# Patient Record
Sex: MALE | Race: WHITE | NOT HISPANIC OR LATINO | Employment: FULL TIME | URBAN - METROPOLITAN AREA
[De-identification: names, ages, dates, MRNs, and addresses within clinical notes are randomized per-mention and may not be internally consistent; named-entity substitution may affect disease eponyms.]

---

## 2023-09-28 ENCOUNTER — OFFICE VISIT (OUTPATIENT)
Dept: FAMILY MEDICINE | Facility: CLINIC | Age: 28
End: 2023-09-28
Payer: COMMERCIAL

## 2023-09-28 VITALS
RESPIRATION RATE: 16 BRPM | OXYGEN SATURATION: 97 % | SYSTOLIC BLOOD PRESSURE: 117 MMHG | TEMPERATURE: 98.9 F | DIASTOLIC BLOOD PRESSURE: 78 MMHG | WEIGHT: 165 LBS | HEART RATE: 93 BPM

## 2023-09-28 DIAGNOSIS — J40 BRONCHITIS: Primary | ICD-10-CM

## 2023-09-28 PROCEDURE — 99203 OFFICE O/P NEW LOW 30 MIN: CPT | Performed by: PHYSICIAN ASSISTANT

## 2023-09-28 RX ORDER — ALBUTEROL SULFATE 90 UG/1
2 AEROSOL, METERED RESPIRATORY (INHALATION) EVERY 6 HOURS PRN
Qty: 18 G | Refills: 0 | Status: SHIPPED | OUTPATIENT
Start: 2023-09-28

## 2023-09-28 RX ORDER — BENZONATATE 100 MG/1
CAPSULE ORAL
Qty: 45 CAPSULE | Refills: 0 | Status: SHIPPED | OUTPATIENT
Start: 2023-09-28

## 2023-09-28 NOTE — PROGRESS NOTES
Chief Complaint   Patient presents with    Cough     Cough x 1 week getting worse. Neg home covid test on Tues.       ASSESSMENT/PLAN:  Gabriel was seen today for cough.    Diagnoses and all orders for this visit:    Bronchitis  -     albuterol (PROAIR HFA/PROVENTIL HFA/VENTOLIN HFA) 108 (90 Base) MCG/ACT inhaler; Inhale 2 puffs into the lungs every 6 hours as needed for shortness of breath or wheezing  -     benzonatate (TESSALON) 100 MG capsule; Take 1-2 capsules by mouth up to 3 x a day as needed with food    Appears well overall, reassuring exam and vitals.  Consistent with viral bronchitis and will give symptomatic cares as outlined in AVS and above    Juan Ramon Pinto PA-C      SUBJECTIVE:  Gabriel is a 28 year old male who presents to urgent care with about 5 to 6 days of cough.  Seems to be worsening little bit.  A little bit productive in the morning.  Daughter is ill as well.  No shortness of breath or chest pain.  Does have some mild fatigue and malaise.    ROS: Pertinent ROS neg other than the symptoms noted above in the HPI.     OBJECTIVE:  /78   Pulse 93   Temp 98.9  F (37.2  C) (Oral)   Resp 16   Wt 74.8 kg (165 lb)   SpO2 97%    GENERAL: healthy, alert and no distress  EYES: Eyes grossly normal to inspection, PERRL and conjunctivae and sclerae normal  HENT: ear canals and TM's normal, nose and mouth without ulcers or lesions  NECK: no adenopathy, no asymmetry, masses, or scars and thyroid normal to palpation  RESP: lungs clear to auscultation - no rales, rhonchi or wheezes, frequent cough  CV: regular rate and rhythm, normal S1 S2, no S3 or S4, no murmur, click or rub, no peripheral edema and peripheral pulses strong    DIAGNOSTICS    No results found for any visits on 09/28/23.     No current outpatient medications on file.     No current facility-administered medications for this visit.      There is no problem list on file for this patient.     No past medical history on file.  No past  surgical history on file.  No family history on file.  Social History     Tobacco Use    Smoking status: Never     Passive exposure: Never    Smokeless tobacco: Never   Substance Use Topics    Alcohol use: Not on file              The plan of care was discussed with the patient. They understand and agree with the course of treatment prescribed. A printed summary was given including instructions and medications.  The use of Dragon/Terascal[employer] dictation services may have been used to construct the content in this note; any grammatical or spelling errors are non-intentional. Please contact the author of this note directly if you are in need of any clarification.

## 2023-09-28 NOTE — PATIENT INSTRUCTIONS
You have  bronchitis  This commonly causes symptoms of your throat, nose, sinuses and bronchi.    You do not need to do anything besides rest and hydrate and your body will get over this.  Sometimes it can take up to 2 weeks to do so.  And the symptoms can be very annoying.    People are commonly contagious for about 3 to 5 days.  Wearing a mask will significantly reduce your risk of transmitting this to someone else if you are within that timeframe.    Some things that you can do to help with your symptoms include:    Pain, malaise and inflammation:  Ibuprofen and Tylenol for pain and inflammation.  I prefer ibuprofen  Ibuprofen 400-600 mg (2-3 of the 200 mg OTC tablets or 400-600 mg of the children's liquid) up to 4 times daily with food or milk  Tylenol 500-1000 mg every 8 hours as needed    For nasal congestion and drainage  Flonase/fluticasone nasal spray 2 sprays in each nostril once a day for 1 - 4 weeks.  This may take several days to become effective.  Consider saline nasal rinses  Psuedofed can help if you tolerate it but do not take if you have high blood pressure     Cough:  Mucinex or guaifenesin can help get mucus out of your body and help with cough.  Dextromethorphan is a cough suppressant that may be helpful  Tessalon Perles may be prescribed    Ear fullness or pain:  Flonase as above  Ibuprofen as above  Otovent, which is a balloon you blow up with your nose and helps pop the ears and regulate the pressure can be bought off of [employer] and works quite well    Supplements that may also be helpful:  Cold snap  Zicam  Zinc    Be sure to eat nutrient dense foods with a good mixture of fats, carbohydrates and proteins.  Your body burns more calories while sick.

## 2024-03-10 ENCOUNTER — HEALTH MAINTENANCE LETTER (OUTPATIENT)
Age: 29
End: 2024-03-10

## 2025-03-16 ENCOUNTER — HEALTH MAINTENANCE LETTER (OUTPATIENT)
Age: 30
End: 2025-03-16

## 2025-04-07 ENCOUNTER — OFFICE VISIT (OUTPATIENT)
Dept: URGENT CARE | Facility: URGENT CARE | Age: 30
End: 2025-04-07
Payer: COMMERCIAL

## 2025-04-07 VITALS
RESPIRATION RATE: 18 BRPM | HEART RATE: 81 BPM | HEIGHT: 67 IN | DIASTOLIC BLOOD PRESSURE: 75 MMHG | SYSTOLIC BLOOD PRESSURE: 109 MMHG | BODY MASS INDEX: 25.11 KG/M2 | OXYGEN SATURATION: 97 % | TEMPERATURE: 98.3 F | WEIGHT: 160 LBS

## 2025-04-07 DIAGNOSIS — B34.9 VIRAL ILLNESS: Primary | ICD-10-CM

## 2025-04-07 LAB
DEPRECATED S PYO AG THROAT QL EIA: NEGATIVE
FLUAV AG SPEC QL IA: NEGATIVE
FLUBV AG SPEC QL IA: NEGATIVE
S PYO DNA THROAT QL NAA+PROBE: NOT DETECTED
SARS-COV-2 RNA RESP QL NAA+PROBE: NEGATIVE

## 2025-04-07 PROCEDURE — 99214 OFFICE O/P EST MOD 30 MIN: CPT | Performed by: PHYSICIAN ASSISTANT

## 2025-04-07 PROCEDURE — 87804 INFLUENZA ASSAY W/OPTIC: CPT | Performed by: PHYSICIAN ASSISTANT

## 2025-04-07 PROCEDURE — 3078F DIAST BP <80 MM HG: CPT | Performed by: PHYSICIAN ASSISTANT

## 2025-04-07 PROCEDURE — 87635 SARS-COV-2 COVID-19 AMP PRB: CPT | Performed by: PHYSICIAN ASSISTANT

## 2025-04-07 PROCEDURE — 3074F SYST BP LT 130 MM HG: CPT | Performed by: PHYSICIAN ASSISTANT

## 2025-04-07 PROCEDURE — 87651 STREP A DNA AMP PROBE: CPT | Performed by: PHYSICIAN ASSISTANT

## 2025-04-07 RX ORDER — ALBUTEROL SULFATE 90 UG/1
2 INHALANT RESPIRATORY (INHALATION) EVERY 6 HOURS
Qty: 18 G | Refills: 0 | Status: SHIPPED | OUTPATIENT
Start: 2025-04-07 | End: 2025-05-07

## 2025-04-07 RX ORDER — BENZONATATE 100 MG/1
100 CAPSULE ORAL 3 TIMES DAILY PRN
Qty: 30 CAPSULE | Refills: 0 | Status: SHIPPED | OUTPATIENT
Start: 2025-04-07 | End: 2025-04-17

## 2025-04-07 NOTE — PROGRESS NOTES
Patient presents with:  Cough: Sore throat, congestion, fever, cough exp to strep and rsv        Clinical Decision Making:  Strep test was obtained and was negative.  Culture is to follow.  Influenza testing is negative.  COVID-19 screening test is pending.  Treatment with albuterol and Tessalon Perles for cough.  Symptomatic care was gone over. Expected course of resolution and indication for return was gone over and questions were answered to patient/parent's satisfaction before discharge.        ICD-10-CM    1. Viral illness  B34.9 Streptococcus A Rapid Screen w/Reflex to PCR     Influenza A & B Antigen     COVID-19 Virus (Coronavirus) by PCR Nose     Group A Streptococcus PCR Throat Swab     albuterol (PROAIR HFA/PROVENTIL HFA/VENTOLIN HFA) 108 (90 Base) MCG/ACT inhaler     benzonatate (TESSALON) 100 MG capsule          Patient Instructions     You were seen today for acute bronchitis. This is likely due to a viral illness.    Symptom management:  - Get plenty of rest  - Avoid smoking and second hand smoke  - May take tylenol or ibuprofen for fever/discomfort  - Drink plenty of non-caffeinated fluids  - Use nasal steroid spray for sinus congestion  - Albuterol inhaler may be used every 6 hours as needed for chest tightness      Reasons to be seen in the emergency room:  - Develop a fever of 100.4 or higher  - Cough changes, coughing up blood, or become short of breath  - Neck stiffness  - Chest pain  - Severe headache  - Unable to tolerate eating or drinking fluids    Otherwise, if no symptom improvement after 5 days, follow-up with your primary care provider.          HPI:  Gabriel Agrawal is a 29 year old male who presents today for four day history of sore throat, chest congestion and cough with myalgias and arthralgias.  Patient has had a fever at home with temperature max of 100.2.  Patient has not had nausea vomiting diarrhea skin rash abdominal pain or urinary symptoms.  Is seen in conjunction with his 2  "children who are having similar symptoms.  He does have a child who has been diagnosed with RSV and strep throat.  Treatment at home is consisted of Tylenol and DayQuil.    History obtained from chart review and the patient.    Problem List:  There are no relevant problems documented for this patient.      No past medical history on file.    Social History     Tobacco Use    Smoking status: Never     Passive exposure: Never    Smokeless tobacco: Never   Substance Use Topics    Alcohol use: Not on file       Review of Systems  As above in HPI otherwise negative.    Vitals:    04/07/25 1426   BP: 109/75   Pulse: 81   Resp: 18   Temp: 98.3  F (36.8  C)   TempSrc: Oral   SpO2: 97%   Weight: 72.6 kg (160 lb)   Height: 1.702 m (5' 7\")       General: Patient is resting comfortably no acute distress is afebrile  HEENT: Head is normocephalic atraumatic   eyes are PERRL EOMI sclera anicteric   TMs are clear bilaterally  Throat is with mild pharyngeal wall erythema and no exudate  No cervical lymphadenopathy present  LUNGS: Clear to auscultation bilaterally  HEART: Regular rate and rhythm  Skin: Without rash non-diaphoretic    Physical Exam      Labs:  Results for orders placed or performed in visit on 04/07/25   Streptococcus A Rapid Screen w/Reflex to PCR     Status: Normal    Specimen: Throat; Swab   Result Value Ref Range    Group A Strep antigen Negative Negative   Influenza A & B Antigen     Status: Normal    Specimen: Nose; Swab   Result Value Ref Range    Influenza A antigen Negative Negative    Influenza B antigen Negative Negative    Narrative    Test results must be correlated with clinical data. If necessary, results should be confirmed by a molecular assay or viral culture.     COVID-19 screening test is pending.    At the end of the encounter, I discussed results, diagnosis, medications. Discussed red flags for immediate return to clinic/ER, as well as indications for follow up if no improvement. Patient " understood and agreed to plan. Patient was stable for discharge.    Urgent Care Clinic Visit    Chief Complaint   Patient presents with    Cough     Sore throat, congestion, fever, cough exp to strep and rsv

## 2025-04-07 NOTE — PROGRESS NOTES
"Patient presents with:  Cough: Sore throat, congestion, fever, cough exp to strep and rsv        Clinical Decision Making:      ICD-10-CM    1. Viral illness  B34.9 Streptococcus A Rapid Screen w/Reflex to PCR     Influenza A & B Antigen     COVID-19 Virus (Coronavirus) by PCR Nose     Group A Streptococcus PCR Throat Swab     albuterol (PROAIR HFA/PROVENTIL HFA/VENTOLIN HFA) 108 (90 Base) MCG/ACT inhaler     benzonatate (TESSALON) 100 MG capsule          Patient Instructions     You were seen today for acute bronchitis. This is likely due to a viral illness.    Symptom management:  - Get plenty of rest  - Avoid smoking and second hand smoke  - May take tylenol or ibuprofen for fever/discomfort  - Drink plenty of non-caffeinated fluids  - Use nasal steroid spray for sinus congestion  - Albuterol inhaler may be used every 6 hours as needed for chest tightness      Reasons to be seen in the emergency room:  - Develop a fever of 100.4 or higher  - Cough changes, coughing up blood, or become short of breath  - Neck stiffness  - Chest pain  - Severe headache  - Unable to tolerate eating or drinking fluids    Otherwise, if no symptom improvement after 5 days, follow-up with your primary care provider.          HPI:  Gabriel Agrawal is a 29 year old male who presents today complaining of ***    History obtained from {source of history:045656}.    Problem List:  There are no relevant problems documented for this patient.      No past medical history on file.    Social History     Tobacco Use     Smoking status: Never     Passive exposure: Never     Smokeless tobacco: Never   Substance Use Topics     Alcohol use: Not on file       Review of Systems  As above in HPI otherwise negative.    Vitals:    04/07/25 1426   BP: 109/75   Pulse: 81   Resp: 18   Temp: 98.3  F (36.8  C)   TempSrc: Oral   SpO2: 97%   Weight: 72.6 kg (160 lb)   Height: 1.702 m (5' 7\")       General: Patient is resting comfortably no acute distress is " afebrile  HEENT: Head is normocephalic atraumatic   eyes are PERRL EOMI sclera anicteric   TMs are clear bilaterally  Throat is with mild pharyngeal wall erythema and no exudate  No cervical lymphadenopathy present  LUNGS: Clear to auscultation bilaterally  HEART: Regular rate and rhythm  Skin: Without rash non-diaphoretic    Physical Exam      Labs:  Results for orders placed or performed in visit on 04/07/25   Streptococcus A Rapid Screen w/Reflex to PCR     Status: Normal    Specimen: Throat; Swab   Result Value Ref Range    Group A Strep antigen Negative Negative   Influenza A & B Antigen     Status: Normal    Specimen: Nose; Swab   Result Value Ref Range    Influenza A antigen Negative Negative    Influenza B antigen Negative Negative    Narrative    Test results must be correlated with clinical data. If necessary, results should be confirmed by a molecular assay or viral culture.       Radiology:  I have personally ordered and preliminarily reviewed the following xray, I have noted ***    No results found.     At the end of the encounter, I discussed results, diagnosis, medications. Discussed red flags for immediate return to clinic/ER, as well as indications for follow up if no improvement. Patient understood and agreed to plan. Patient was stable for discharge.

## 2025-04-07 NOTE — PATIENT INSTRUCTIONS
You were seen today for acute bronchitis. This is likely due to a viral illness.    Symptom management:  - Get plenty of rest  - Avoid smoking and second hand smoke  - May take tylenol or ibuprofen for fever/discomfort  - Drink plenty of non-caffeinated fluids  - Use nasal steroid spray for sinus congestion  - Albuterol inhaler may be used every 6 hours as needed for chest tightness      Reasons to be seen in the emergency room:  - Develop a fever of 100.4 or higher  - Cough changes, coughing up blood, or become short of breath  - Neck stiffness  - Chest pain  - Severe headache  - Unable to tolerate eating or drinking fluids    Otherwise, if no symptom improvement after 5 days, follow-up with your primary care provider.